# Patient Record
Sex: FEMALE | Race: WHITE | URBAN - METROPOLITAN AREA
[De-identification: names, ages, dates, MRNs, and addresses within clinical notes are randomized per-mention and may not be internally consistent; named-entity substitution may affect disease eponyms.]

---

## 2022-05-31 ENCOUNTER — OFFICE VISIT (OUTPATIENT)
Dept: URBAN - METROPOLITAN AREA CLINIC 54 | Facility: CLINIC | Age: 72
End: 2022-05-31
Payer: COMMERCIAL

## 2022-05-31 DIAGNOSIS — H43.813 BILATERAL VITREOUS DETACHMENT OF EYES: Primary | ICD-10-CM

## 2022-05-31 DIAGNOSIS — Z96.1 PRESENCE OF PSEUDOPHAKIA: ICD-10-CM

## 2022-05-31 DIAGNOSIS — H35.373 PUCKERING OF MACULA, BILATERAL: ICD-10-CM

## 2022-05-31 DIAGNOSIS — H26.492 OTHER SECONDARY CATARACT, LEFT EYE: ICD-10-CM

## 2022-05-31 PROCEDURE — 99204 OFFICE O/P NEW MOD 45 MIN: CPT | Performed by: OPHTHALMOLOGY

## 2022-05-31 PROCEDURE — 92134 CPTRZ OPH DX IMG PST SGM RTA: CPT | Performed by: OPHTHALMOLOGY

## 2022-05-31 ASSESSMENT — INTRAOCULAR PRESSURE
OS: 12
OD: 13

## 2022-05-31 NOTE — IMPRESSION/PLAN
Impression: Puckering of macula, bilateral: H35.373. Bilateral. Plan: Exam/OCT reveals non-visually significant  macular pucker formation OU. Observe.

## 2022-05-31 NOTE — IMPRESSION/PLAN
Impression: Bilateral vitreous detachment of eyes: H43.813. Bilateral. Plan: PVD with no retinal break or retinal detachment OU. Reviewed signs and symptoms of a retinal tear and detachment. Advised to return immediately for new floaters, flashing lights or a shadow in the vision. 

Return PRN, OCT OU

## 2022-09-29 ENCOUNTER — OFFICE VISIT (OUTPATIENT)
Dept: URBAN - METROPOLITAN AREA CLINIC 44 | Facility: CLINIC | Age: 72
End: 2022-09-29
Payer: COMMERCIAL

## 2022-09-29 DIAGNOSIS — H35.373 PUCKERING OF MACULA, BILATERAL: Primary | ICD-10-CM

## 2022-09-29 DIAGNOSIS — H43.313 VITREOUS MEMBRANES AND STRANDS, BILATERAL: ICD-10-CM

## 2022-09-29 PROCEDURE — 92134 CPTRZ OPH DX IMG PST SGM RTA: CPT | Performed by: OPTOMETRIST

## 2022-09-29 PROCEDURE — 99203 OFFICE O/P NEW LOW 30 MIN: CPT | Performed by: OPTOMETRIST

## 2022-09-29 ASSESSMENT — INTRAOCULAR PRESSURE
OD: 19
OS: 19

## 2022-09-29 NOTE — IMPRESSION/PLAN
Impression: Puckering of macula, bilateral: H35.373. Per OCT and exam condition is stable. Patient has no significant visual complaints at this time. Plan: Discussed findings and use of amsler grid to monitor. RTC 12 months for complete + OCT(Mac) and sooner if changes in vision are observed.

## 2022-09-29 NOTE — IMPRESSION/PLAN
Impression: Vitreous membranes and strands, bilateral: H43.313. Reports new floaters and flashes for over 1 month. Patient symptoms non specific. No retinal defects noted. Patient reports occasional floaters which are not interfering with daily activities and vision. Plan: PLAN: Discussed S/S of RD/RT. Stressed importance to RTC immediately otherwise RTC 2-3 weeks for complete + OPTOS.

## 2022-10-18 ENCOUNTER — OFFICE VISIT (OUTPATIENT)
Dept: URBAN - METROPOLITAN AREA CLINIC 44 | Facility: CLINIC | Age: 72
End: 2022-10-18
Payer: COMMERCIAL

## 2022-10-18 DIAGNOSIS — H43.813 BILATERAL VITREOUS DETACHMENT OF EYES: Primary | ICD-10-CM

## 2022-10-18 DIAGNOSIS — H35.373 PUCKERING OF MACULA, BILATERAL: ICD-10-CM

## 2022-10-18 PROCEDURE — 99213 OFFICE O/P EST LOW 20 MIN: CPT | Performed by: OPTOMETRIST

## 2022-10-18 ASSESSMENT — INTRAOCULAR PRESSURE
OD: 14
OS: 14

## 2022-10-18 NOTE — IMPRESSION/PLAN
Impression: Bilateral vitreous detachment of eyes: H43.813 Bilateral.
 Reports no new floaters, flashes  or veiling. No retinal defects noted. Patient reports occasional floaters which are not interfering with daily activities and vision. Plan: PLAN: Discussed S/S of RD/RT. Stressed importance to RTC immediately if S/S progress.

## 2022-10-18 NOTE — IMPRESSION/PLAN
Impression: Puckering of macula, bilateral: H35.373. Stable. Per OCT and exam condition is stable. Patient has no significant visual complaints at this time.  Plan: Observe